# Patient Record
(demographics unavailable — no encounter records)

---
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
## 2024-12-12 NOTE — PHYSICAL EXAM
[Hair] : Hair [Scalp] : Scalp [Face] : Face [Nose] : Nose [Eyelids] : Eyelids [Ears] : Ears [Lips] : Lips [Neck] : Neck [Back] : Back [FreeTextEntry3] : confluent well-circumscribed hypopigmented patch over occipital scalp extending laterally to left neck and inferiorly to upper back
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
